# Patient Record
Sex: FEMALE | Race: WHITE | NOT HISPANIC OR LATINO | Employment: FULL TIME | URBAN - METROPOLITAN AREA
[De-identification: names, ages, dates, MRNs, and addresses within clinical notes are randomized per-mention and may not be internally consistent; named-entity substitution may affect disease eponyms.]

---

## 2023-10-16 DIAGNOSIS — Z30.41 ENCOUNTER FOR SURVEILLANCE OF CONTRACEPTIVE PILLS: Primary | ICD-10-CM

## 2023-10-16 RX ORDER — NORGESTIMATE AND ETHINYL ESTRADIOL 7DAYSX3 28
1 KIT ORAL DAILY
Qty: 28 TABLET | Refills: 11 | Status: SHIPPED | OUTPATIENT
Start: 2023-10-16 | End: 2023-12-20 | Stop reason: SDUPTHER

## 2023-10-17 ENCOUNTER — TELEPHONE (OUTPATIENT)
Dept: OBSTETRICS AND GYNECOLOGY | Facility: CLINIC | Age: 28
End: 2023-10-17
Payer: COMMERCIAL

## 2023-10-17 PROBLEM — K62.89 OTHER SPECIFIED DISEASES OF ANUS AND RECTUM: Status: ACTIVE | Noted: 2023-10-17

## 2023-10-17 PROBLEM — F41.1 ANXIETY, GENERALIZED: Status: ACTIVE | Noted: 2023-10-17

## 2023-10-17 RX ORDER — TRIAMCINOLONE ACETONIDE 1 MG/G
OINTMENT TOPICAL
COMMUNITY
Start: 2021-07-22 | End: 2024-01-31 | Stop reason: WASHOUT

## 2023-10-17 NOTE — TELEPHONE ENCOUNTER
----- Message from Ely Brown CMA sent at 10/16/2023  1:49 PM EDT -----  Regarding: FW: Refill Request    ----- Message -----  From: Chuyita Shaffer  Sent: 10/16/2023   9:07 AM EDT  To:  [id number] Clinical Support Staff  Subject: Refill Request                                   Patient calling for a refill of Tri Femynor.     Local pharmacy entered into Epic.     Note:  Annual Exam scheduled for 11-30-23.     Thank you

## 2023-10-18 ENCOUNTER — APPOINTMENT (OUTPATIENT)
Dept: OBSTETRICS AND GYNECOLOGY | Facility: CLINIC | Age: 28
End: 2023-10-18
Payer: COMMERCIAL

## 2023-11-30 ENCOUNTER — APPOINTMENT (OUTPATIENT)
Dept: OBSTETRICS AND GYNECOLOGY | Facility: CLINIC | Age: 28
End: 2023-11-30
Payer: COMMERCIAL

## 2023-12-20 ENCOUNTER — OFFICE VISIT (OUTPATIENT)
Dept: OBSTETRICS AND GYNECOLOGY | Facility: CLINIC | Age: 28
End: 2023-12-20
Payer: COMMERCIAL

## 2023-12-20 VITALS
SYSTOLIC BLOOD PRESSURE: 135 MMHG | HEIGHT: 63 IN | BODY MASS INDEX: 22.47 KG/M2 | DIASTOLIC BLOOD PRESSURE: 89 MMHG | WEIGHT: 126.8 LBS

## 2023-12-20 DIAGNOSIS — Z30.41 SURVEILLANCE FOR BIRTH CONTROL, ORAL CONTRACEPTIVES: ICD-10-CM

## 2023-12-20 DIAGNOSIS — Z30.41 ENCOUNTER FOR SURVEILLANCE OF CONTRACEPTIVE PILLS: ICD-10-CM

## 2023-12-20 DIAGNOSIS — Z01.419 WELL WOMAN EXAM WITH ROUTINE GYNECOLOGICAL EXAM: Primary | ICD-10-CM

## 2023-12-20 PROCEDURE — 99395 PREV VISIT EST AGE 18-39: CPT | Performed by: NURSE PRACTITIONER

## 2023-12-20 PROCEDURE — 1036F TOBACCO NON-USER: CPT | Performed by: NURSE PRACTITIONER

## 2023-12-20 RX ORDER — NORGESTIMATE AND ETHINYL ESTRADIOL 7DAYSX3 28
1 KIT ORAL DAILY
Qty: 84 TABLET | Refills: 3 | Status: SHIPPED | OUTPATIENT
Start: 2023-12-20 | End: 2024-01-11

## 2023-12-20 NOTE — PROGRESS NOTES
HPI: Eldon Feliciano is a 27 y.o. year old  presenting for annual gyn exam  Pt seen by me yearly, still on OCPs  Denies blood clots, stroke, HTN and migraines with aura.    Current concerns: none, pt doing well  Has long-term BF, working in Interior design, has been busy    Cycles are regular monthly, with 4 days flow   LMP:  Patient's last menstrual period was 2023.  Sexually active: yes  STI concerns: no  Contraception: OCPs   Last pap: 2022 normal  History of abnormal pap: no  Other gyn history: no   OB History    No obstetric history on file.      No past medical history on file.   No past surgical history on file.   Social History    Socioeconomic History      Marital status: Significant Other      Spouse name: Not on file      Number of children: Not on file      Years of education: Not on file      Highest education level: Not on file    Occupational History      Not on file    Tobacco Use      Smoking status: Never      Smokeless tobacco: Never    Substance and Sexual Activity      Alcohol use: Not on file      Drug use: Not on file      Sexual activity: Not on file    Other Topics      Concerns:        Not on file    Social History Narrative      Not on file    Social Determinants of Health  Financial Resource Strain: Not on file  Food Insecurity: Not on file  Transportation Needs: Not on file  Physical Activity: Not on file  Stress: Not on file  Social Connections: Not on file  Intimate Partner Violence: Not on file  Housing Stability: Not on file   Review of patient's family history indicates:  Problem: No Known Problems      Relation: Father          Name:               Age of Onset: (Not Specified)              Comment: Medical history unknown  Problem: Breast cancer      Relation: Maternal Grandmother          Name:               Age of Onset: (Not Specified)     Current Outpatient Medications:   norgestimate-ethinyl estradioL (Ortho Tri-Cyclen,Trinessa) 0.18/0.215/0.25 mg-35 mcg (28)  "tablet, Take 1 tablet by mouth once daily., Disp: 28 tablet, Rfl: 11  triamcinolone (Kenalog) 0.1 % ointment, APPLY SPARINGLY TO AFFECTED AREA(S) TWICE DAILY, Disp: , Rfl:           REVIEW OF SYSTEMS:  Breast. denies masses, nipple discharge  : denies dysuria, hematuria incontinence   Gl: denies change in bowel habits, blood in stools      PHYSICAL EXAM:  Vitals: /89 (BP Location: Right arm, Patient Position: Sitting)   Ht 1.588 m (5' 2.5\")   Wt 57.5 kg (126 lb 12.8 oz)   LMP 12/13/2023 Comment: OCP  BMI 22.82 kg/m²   Gen: well appearing woman in NAD  HEENT: normocephalic, thyroid not enlarged, no lymphadenopathy  CV: no m/r/g  Chest: CTAB, no w/r/r  Breast: normal tissue bilaterally without masses, skin changes, lymphadenopathy   Abdomen: soft, nontender, no masses/hernias, liver and spleen not enlarged   Pelvic:  - external genitalia: normal  - vagina: normal  - cervix: normal  - uterus: normal size, nontender  - adnexa: no palpable masses or tenderness  RECTAL: no external hemorrhoids; rectal exam deferred    ASSESSMENT/PLAN :    1) Health maintenance, Well-woman exam.  Pap/HPV up to date   Mammogram starting age 39yo  Nutrition, exercise and routine health maintenance exams reviewed.  Calcium/Vitamin D supplementation information provided   Smoking cessation: non-smoker  2) Contraception: OCPs satisfactory, new Rx sent.   Discussed DVT and cardiovascular risk with OCP use. Patient is aware of this risk and desires to continue current oral contraceptive.   3) STD screening: declines, no concerns  4) Follow up one year or sooner as needed   "

## 2024-01-09 DIAGNOSIS — Z30.41 ENCOUNTER FOR SURVEILLANCE OF CONTRACEPTIVE PILLS: ICD-10-CM

## 2024-01-11 RX ORDER — NORGESTIMATE AND ETHINYL ESTRADIOL 7DAYSX3 28
1 KIT ORAL DAILY
Qty: 84 TABLET | Refills: 3 | Status: SHIPPED | OUTPATIENT
Start: 2024-01-11

## 2024-01-31 ENCOUNTER — OFFICE VISIT (OUTPATIENT)
Dept: PRIMARY CARE | Facility: CLINIC | Age: 29
End: 2024-01-31
Payer: COMMERCIAL

## 2024-01-31 VITALS
WEIGHT: 129.2 LBS | TEMPERATURE: 97.9 F | SYSTOLIC BLOOD PRESSURE: 126 MMHG | HEART RATE: 93 BPM | HEIGHT: 62 IN | BODY MASS INDEX: 23.77 KG/M2 | OXYGEN SATURATION: 98 % | DIASTOLIC BLOOD PRESSURE: 82 MMHG

## 2024-01-31 DIAGNOSIS — F41.1 ANXIETY, GENERALIZED: ICD-10-CM

## 2024-01-31 DIAGNOSIS — Z01.84 IMMUNITY STATUS TESTING: ICD-10-CM

## 2024-01-31 DIAGNOSIS — K59.09 OTHER CONSTIPATION: ICD-10-CM

## 2024-01-31 DIAGNOSIS — F32.1 CURRENT MODERATE EPISODE OF MAJOR DEPRESSIVE DISORDER WITHOUT PRIOR EPISODE (MULTI): ICD-10-CM

## 2024-01-31 DIAGNOSIS — Z23 NEED FOR DIPHTHERIA-TETANUS-PERTUSSIS (TDAP) VACCINE: ICD-10-CM

## 2024-01-31 DIAGNOSIS — Z00.00 ENCOUNTER FOR PREVENTATIVE ADULT HEALTH CARE EXAMINATION: Primary | ICD-10-CM

## 2024-01-31 PROCEDURE — 90471 IMMUNIZATION ADMIN: CPT | Performed by: INTERNAL MEDICINE

## 2024-01-31 PROCEDURE — 90715 TDAP VACCINE 7 YRS/> IM: CPT | Performed by: INTERNAL MEDICINE

## 2024-01-31 PROCEDURE — 93000 ELECTROCARDIOGRAM COMPLETE: CPT | Performed by: INTERNAL MEDICINE

## 2024-01-31 PROCEDURE — 1036F TOBACCO NON-USER: CPT | Performed by: INTERNAL MEDICINE

## 2024-01-31 PROCEDURE — 99385 PREV VISIT NEW AGE 18-39: CPT | Performed by: INTERNAL MEDICINE

## 2024-01-31 RX ORDER — ESCITALOPRAM OXALATE 10 MG/1
10 TABLET ORAL DAILY
Qty: 90 TABLET | Refills: 0 | Status: SHIPPED | OUTPATIENT
Start: 2024-01-31 | End: 2024-02-27

## 2024-01-31 RX ORDER — AMOXICILLIN 500 MG/1
CAPSULE ORAL
COMMUNITY
Start: 2023-12-15 | End: 2024-01-31 | Stop reason: WASHOUT

## 2024-01-31 ASSESSMENT — ANXIETY QUESTIONNAIRES
7. FEELING AFRAID AS IF SOMETHING AWFUL MIGHT HAPPEN: MORE THAN HALF THE DAYS
1. FEELING NERVOUS, ANXIOUS, OR ON EDGE: MORE THAN HALF THE DAYS
GAD7 TOTAL SCORE: 16
3. WORRYING TOO MUCH ABOUT DIFFERENT THINGS: NEARLY EVERY DAY
6. BECOMING EASILY ANNOYED OR IRRITABLE: MORE THAN HALF THE DAYS
IF YOU CHECKED OFF ANY PROBLEMS ON THIS QUESTIONNAIRE, HOW DIFFICULT HAVE THESE PROBLEMS MADE IT FOR YOU TO DO YOUR WORK, TAKE CARE OF THINGS AT HOME, OR GET ALONG WITH OTHER PEOPLE: VERY DIFFICULT
2. NOT BEING ABLE TO STOP OR CONTROL WORRYING: NEARLY EVERY DAY
4. TROUBLE RELAXING: MORE THAN HALF THE DAYS
5. BEING SO RESTLESS THAT IT IS HARD TO SIT STILL: MORE THAN HALF THE DAYS

## 2024-01-31 ASSESSMENT — PATIENT HEALTH QUESTIONNAIRE - PHQ9
2. FEELING DOWN, DEPRESSED OR HOPELESS: SEVERAL DAYS
1. LITTLE INTEREST OR PLEASURE IN DOING THINGS: SEVERAL DAYS
8. MOVING OR SPEAKING SO SLOWLY THAT OTHER PEOPLE COULD HAVE NOTICED. OR THE OPPOSITE, BEING SO FIGETY OR RESTLESS THAT YOU HAVE BEEN MOVING AROUND A LOT MORE THAN USUAL: NOT AT ALL
SUM OF ALL RESPONSES TO PHQ9 QUESTIONS 1 & 2: 2
6. FEELING BAD ABOUT YOURSELF - OR THAT YOU ARE A FAILURE OR HAVE LET YOURSELF OR YOUR FAMILY DOWN: SEVERAL DAYS
9. THOUGHTS THAT YOU WOULD BE BETTER OFF DEAD, OR OF HURTING YOURSELF: NOT AT ALL
10. IF YOU CHECKED OFF ANY PROBLEMS, HOW DIFFICULT HAVE THESE PROBLEMS MADE IT FOR YOU TO DO YOUR WORK, TAKE CARE OF THINGS AT HOME, OR GET ALONG WITH OTHER PEOPLE: SOMEWHAT DIFFICULT
5. POOR APPETITE OR OVEREATING: SEVERAL DAYS
4. FEELING TIRED OR HAVING LITTLE ENERGY: MORE THAN HALF THE DAYS
SUM OF ALL RESPONSES TO PHQ QUESTIONS 1-9: 10
3. TROUBLE FALLING OR STAYING ASLEEP: NEARLY EVERY DAY
7. TROUBLE CONCENTRATING ON THINGS, SUCH AS READING THE NEWSPAPER OR WATCHING TELEVISION: SEVERAL DAYS

## 2024-01-31 ASSESSMENT — PAIN SCALES - GENERAL: PAINLEVEL: 0-NO PAIN

## 2024-01-31 NOTE — ASSESSMENT & PLAN NOTE
With concomitant anxiety, interested in medical management for now, not currently interested in behavioral management.  Initiate low-dose Lexapro.  Potential side effects and management expectations reviewed.  EKG today.  Will check in with me in 2 weeks and then follow-up again in 2 months

## 2024-01-31 NOTE — PROGRESS NOTES
"Subjective     Patient ID: Eldon Feliciano is a 28 y.o. female who presents for Establish Care, New Patient Visit, and Annual Exam.  HPI    28-year-old female new here for establishment of care for preventive care visit. Has not been seen in some time.     Past medical history  -Depression and anxiety-significant impacts her functioning.  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 More pasta than what she would want.   - Exercise - [employer] 4-5x/week when on a good schedule. Always been great.    - Sleep - poor, limited related to stress and anxiety       Review of Systems:   General: No constitutional symptoms, significant changes in weight  HEENT: No headaches, changes in vision or hearing, no sinus or dental issues   Cardiac: No chest pain, palpitations, dyspnea on exertion   Lungs: No cough, wheezing, shortness of breath   Abdomen: No abdominal pain, nausea/vomiting, diarrhea. +constipation   : No urinary complaints   Musculoskeletal: no joint pains, swelling or tenderness   Heme: No bleeding or thrombosis issues   Lymph: No swollen lymph glands   Skin: No rashes or lesions   Psych: as described      Objective       Current Outpatient Medications:     norgestimate-ethinyl estradioL (Ortho Tri-Cyclen,Trinessa) 0.18/0.215/0.25 mg-35 mcg (28) tablet, TAKE 1 TABLET BY MOUTH EVERY DAY, Disp: 84 tablet, Rfl: 3    escitalopram (Lexapro) 10 mg tablet, Take 1 tablet (10 mg) by mouth once daily., Disp: 90 tablet, Rfl: 0      /82   Pulse 93   Temp 36.6 °C (97.9 °F) (Temporal)   Ht 1.575 m (5' 2\")   Wt 58.6 kg (129 lb 3.2 oz)   SpO2 98%   BMI 23.63 kg/m²       Physical Examination:   General: Awake, alert, appears stated age   Head/eyes/ears: NCAT, EOMI, PERRL, TM WNL, no cerumen  Throat: " moist mucus membranes, no pharyngeal erythema  Neck: Supple, nontender, no lymphadenopathy, thyroid exam unremarkable   Breast exam: No palpable lumps, no discharge, no noted axillary lymphadenopathy. Chaperone offered and declined  Heart: RRR, no murmurs, rubs or gallops  Lungs: CTA bilaterally, no rhonchi rales or wheezes   Abdomen: Soft, NT/ND  Extremities: No edema, 2+ DP pulses   Skin: No concerning skin lesions on visualized skin   Neuro: AAO x 3, no FND, gait unremarkable    Assessment/Plan   Problem List Items Addressed This Visit      Adult Health Examination  Age appropriate screening performed   Healthy lifestyle reviewed.   No additional pertinent family history or toxic habits   No high risk sexual behavior, declines STI screen  Cancer screening:   -Discussed self breast exams  - Pap smear- 9/22 follows with Rossana Ingram   - Skin cancer prevention strategies reviewed   Immunizations: Flu shot declined, COVID booster due, Tdap due,   Dental and visual examinations UTD   Discussed adequate Vitamin D intake      Anxiety, generalized    Relevant Medications    escitalopram (Lexapro) 10 mg tablet    Other Relevant Orders    Follow Up In [facility] - PCP - Established    ECG 12 lead (Clinic Performed)    Current moderate episode of major depressive disorder without prior episode (CMS/HCC)     With concomitant anxiety, interested in medical management for now, not currently interested in behavioral management.  Initiate low-dose Lexapro.  Potential side effects and management expectations reviewed.  EKG today.  Will check in with me in 2 weeks and then follow-up again in 2 months         Relevant Medications    escitalopram (Lexapro) 10 mg tablet    Other Relevant Orders    Follow Up In [facility] - PCP - Established    ECG 12 lead (Clinic Performed)    Other constipation     Advised fiber intake and MiraLAX no concerns for IBS.          Other Visit Diagnoses       Encounter for  preventative adult health care examination    -  Primary    Relevant Orders    Comprehensive Metabolic Panel    CBC and Auto Differential    Hemoglobin A1C    Lipid Panel    TSH with reflex to Free T4 if abnormal    Vitamin D 25-Hydroxy,Total (for eval of Vitamin D levels)    Immunity status testing        Relevant Orders    Hepatitis B Surface Antigen    Hepatitis B Surface Antibody    Need for diphtheria-tetanus-pertussis (Tdap) vaccine        Relevant Orders    Tdap vaccine, age 7 years and older (Completed)     Followup 2 months

## 2024-01-31 NOTE — PATIENT INSTRUCTIONS
Eldon,   I have ordered blood and/or urine tests for you to do today. The lab can be found on this floor (2nd floor) next to the pharmacy across from the elevators.    For depression and anxiety   Start Lexapro 5mg once per day (half a tablet). If no response after 1 week increase the dose to full tablet once per day for 2 weeks  Let me know how you feel via messaging after 2 weeks  Constipation - increase fiber and add miralax   Followup 2-3 months

## 2024-02-23 DIAGNOSIS — F41.1 ANXIETY, GENERALIZED: ICD-10-CM

## 2024-02-23 DIAGNOSIS — F32.1 CURRENT MODERATE EPISODE OF MAJOR DEPRESSIVE DISORDER WITHOUT PRIOR EPISODE (MULTI): ICD-10-CM

## 2024-02-27 RX ORDER — ESCITALOPRAM OXALATE 10 MG/1
10 TABLET ORAL DAILY
Qty: 30 TABLET | Refills: 0 | Status: SHIPPED | OUTPATIENT
Start: 2024-02-27 | End: 2024-04-16

## 2024-03-01 ENCOUNTER — LAB (OUTPATIENT)
Dept: LAB | Facility: LAB | Age: 29
End: 2024-03-01
Payer: COMMERCIAL

## 2024-03-01 DIAGNOSIS — Z00.00 ENCOUNTER FOR PREVENTATIVE ADULT HEALTH CARE EXAMINATION: ICD-10-CM

## 2024-03-01 DIAGNOSIS — Z01.84 IMMUNITY STATUS TESTING: ICD-10-CM

## 2024-03-01 LAB
25(OH)D3 SERPL-MCNC: 28 NG/ML (ref 30–100)
ALBUMIN SERPL BCP-MCNC: 4.6 G/DL (ref 3.4–5)
ALP SERPL-CCNC: 44 U/L (ref 33–110)
ALT SERPL W P-5'-P-CCNC: 9 U/L (ref 7–45)
ANION GAP SERPL CALC-SCNC: 16 MMOL/L (ref 10–20)
AST SERPL W P-5'-P-CCNC: 15 U/L (ref 9–39)
BASOPHILS # BLD AUTO: 0.05 X10*3/UL (ref 0–0.1)
BASOPHILS NFR BLD AUTO: 0.7 %
BILIRUB SERPL-MCNC: 0.7 MG/DL (ref 0–1.2)
BUN SERPL-MCNC: 16 MG/DL (ref 6–23)
CALCIUM SERPL-MCNC: 9.6 MG/DL (ref 8.6–10.6)
CHLORIDE SERPL-SCNC: 103 MMOL/L (ref 98–107)
CHOLEST SERPL-MCNC: 200 MG/DL (ref 0–199)
CHOLESTEROL/HDL RATIO: 2.6
CO2 SERPL-SCNC: 23 MMOL/L (ref 21–32)
CREAT SERPL-MCNC: 0.8 MG/DL (ref 0.5–1.05)
EGFRCR SERPLBLD CKD-EPI 2021: >90 ML/MIN/1.73M*2
EOSINOPHIL # BLD AUTO: 0.1 X10*3/UL (ref 0–0.7)
EOSINOPHIL NFR BLD AUTO: 1.3 %
ERYTHROCYTE [DISTWIDTH] IN BLOOD BY AUTOMATED COUNT: 13 % (ref 11.5–14.5)
EST. AVERAGE GLUCOSE BLD GHB EST-MCNC: 94 MG/DL
GLUCOSE SERPL-MCNC: 82 MG/DL (ref 74–99)
HBA1C MFR BLD: 4.9 %
HBV SURFACE AB SER-ACNC: <3.1 MIU/ML
HBV SURFACE AG SERPL QL IA: NONREACTIVE
HCT VFR BLD AUTO: 42.1 % (ref 36–46)
HDLC SERPL-MCNC: 77.6 MG/DL
HGB BLD-MCNC: 13 G/DL (ref 12–16)
IMM GRANULOCYTES # BLD AUTO: 0.03 X10*3/UL (ref 0–0.7)
IMM GRANULOCYTES NFR BLD AUTO: 0.4 % (ref 0–0.9)
LDLC SERPL CALC-MCNC: 87 MG/DL
LYMPHOCYTES # BLD AUTO: 2.74 X10*3/UL (ref 1.2–4.8)
LYMPHOCYTES NFR BLD AUTO: 35.6 %
MCH RBC QN AUTO: 25.7 PG (ref 26–34)
MCHC RBC AUTO-ENTMCNC: 30.9 G/DL (ref 32–36)
MCV RBC AUTO: 83 FL (ref 80–100)
MONOCYTES # BLD AUTO: 0.36 X10*3/UL (ref 0.1–1)
MONOCYTES NFR BLD AUTO: 4.7 %
NEUTROPHILS # BLD AUTO: 4.41 X10*3/UL (ref 1.2–7.7)
NEUTROPHILS NFR BLD AUTO: 57.3 %
NON HDL CHOLESTEROL: 122 MG/DL (ref 0–149)
NRBC BLD-RTO: 0 /100 WBCS (ref 0–0)
PLATELET # BLD AUTO: 240 X10*3/UL (ref 150–450)
POTASSIUM SERPL-SCNC: 4.2 MMOL/L (ref 3.5–5.3)
PROT SERPL-MCNC: 7.3 G/DL (ref 6.4–8.2)
RBC # BLD AUTO: 5.05 X10*6/UL (ref 4–5.2)
SODIUM SERPL-SCNC: 138 MMOL/L (ref 136–145)
TRIGL SERPL-MCNC: 177 MG/DL (ref 0–149)
TSH SERPL-ACNC: 3.94 MIU/L (ref 0.44–3.98)
VLDL: 35 MG/DL (ref 0–40)
WBC # BLD AUTO: 7.7 X10*3/UL (ref 4.4–11.3)

## 2024-03-01 PROCEDURE — 80053 COMPREHEN METABOLIC PANEL: CPT

## 2024-03-01 PROCEDURE — 84443 ASSAY THYROID STIM HORMONE: CPT

## 2024-03-01 PROCEDURE — 80061 LIPID PANEL: CPT

## 2024-03-01 PROCEDURE — 83036 HEMOGLOBIN GLYCOSYLATED A1C: CPT

## 2024-03-01 PROCEDURE — 36415 COLL VENOUS BLD VENIPUNCTURE: CPT

## 2024-03-01 PROCEDURE — 86706 HEP B SURFACE ANTIBODY: CPT

## 2024-03-01 PROCEDURE — 87340 HEPATITIS B SURFACE AG IA: CPT

## 2024-03-01 PROCEDURE — 85025 COMPLETE CBC W/AUTO DIFF WBC: CPT

## 2024-03-01 PROCEDURE — 82306 VITAMIN D 25 HYDROXY: CPT

## 2024-04-16 DIAGNOSIS — F32.1 CURRENT MODERATE EPISODE OF MAJOR DEPRESSIVE DISORDER WITHOUT PRIOR EPISODE (MULTI): ICD-10-CM

## 2024-04-16 DIAGNOSIS — F41.1 ANXIETY, GENERALIZED: ICD-10-CM

## 2024-04-16 RX ORDER — ESCITALOPRAM OXALATE 10 MG/1
10 TABLET ORAL DAILY
Qty: 90 TABLET | Refills: 0 | Status: SHIPPED | OUTPATIENT
Start: 2024-04-16 | End: 2024-05-06 | Stop reason: DRUGHIGH

## 2024-05-06 DIAGNOSIS — F32.1 CURRENT MODERATE EPISODE OF MAJOR DEPRESSIVE DISORDER WITHOUT PRIOR EPISODE (MULTI): ICD-10-CM

## 2024-05-06 DIAGNOSIS — F41.1 ANXIETY, GENERALIZED: Primary | ICD-10-CM

## 2024-05-06 RX ORDER — ESCITALOPRAM OXALATE 20 MG/1
20 TABLET ORAL DAILY
Qty: 30 TABLET | Refills: 1 | Status: SHIPPED | OUTPATIENT
Start: 2024-05-06 | End: 2024-07-05

## 2024-06-12 ENCOUNTER — APPOINTMENT (OUTPATIENT)
Dept: PRIMARY CARE | Facility: CLINIC | Age: 29
End: 2024-06-12
Payer: COMMERCIAL

## 2024-06-19 ENCOUNTER — APPOINTMENT (OUTPATIENT)
Dept: PRIMARY CARE | Facility: CLINIC | Age: 29
End: 2024-06-19
Payer: COMMERCIAL

## 2024-06-19 VITALS
BODY MASS INDEX: 25.42 KG/M2 | DIASTOLIC BLOOD PRESSURE: 80 MMHG | SYSTOLIC BLOOD PRESSURE: 126 MMHG | HEART RATE: 78 BPM | OXYGEN SATURATION: 97 % | TEMPERATURE: 97.3 F | WEIGHT: 139 LBS

## 2024-06-19 DIAGNOSIS — F41.1 ANXIETY, GENERALIZED: Primary | ICD-10-CM

## 2024-06-19 DIAGNOSIS — F32.1 CURRENT MODERATE EPISODE OF MAJOR DEPRESSIVE DISORDER WITHOUT PRIOR EPISODE (MULTI): ICD-10-CM

## 2024-06-19 PROCEDURE — 1036F TOBACCO NON-USER: CPT | Performed by: INTERNAL MEDICINE

## 2024-06-19 PROCEDURE — 99213 OFFICE O/P EST LOW 20 MIN: CPT | Performed by: INTERNAL MEDICINE

## 2024-06-19 RX ORDER — ESCITALOPRAM OXALATE 20 MG/1
20 TABLET ORAL DAILY
Qty: 90 TABLET | Refills: 1 | Status: SHIPPED | OUTPATIENT
Start: 2024-06-19 | End: 2024-12-16

## 2024-06-19 ASSESSMENT — ANXIETY QUESTIONNAIRES
1. FEELING NERVOUS, ANXIOUS, OR ON EDGE: SEVERAL DAYS
3. WORRYING TOO MUCH ABOUT DIFFERENT THINGS: MORE THAN HALF THE DAYS
7. FEELING AFRAID AS IF SOMETHING AWFUL MIGHT HAPPEN: SEVERAL DAYS
GAD7 TOTAL SCORE: 9
6. BECOMING EASILY ANNOYED OR IRRITABLE: SEVERAL DAYS
IF YOU CHECKED OFF ANY PROBLEMS ON THIS QUESTIONNAIRE, HOW DIFFICULT HAVE THESE PROBLEMS MADE IT FOR YOU TO DO YOUR WORK, TAKE CARE OF THINGS AT HOME, OR GET ALONG WITH OTHER PEOPLE: SOMEWHAT DIFFICULT
2. NOT BEING ABLE TO STOP OR CONTROL WORRYING: SEVERAL DAYS
5. BEING SO RESTLESS THAT IT IS HARD TO SIT STILL: SEVERAL DAYS
4. TROUBLE RELAXING: MORE THAN HALF THE DAYS

## 2024-06-19 ASSESSMENT — PATIENT HEALTH QUESTIONNAIRE - PHQ9
2. FEELING DOWN, DEPRESSED OR HOPELESS: SEVERAL DAYS
SUM OF ALL RESPONSES TO PHQ QUESTIONS 1-9: 9
1. LITTLE INTEREST OR PLEASURE IN DOING THINGS: NOT AT ALL
4. FEELING TIRED OR HAVING LITTLE ENERGY: MORE THAN HALF THE DAYS
9. THOUGHTS THAT YOU WOULD BE BETTER OFF DEAD, OR OF HURTING YOURSELF: NOT AT ALL
5. POOR APPETITE OR OVEREATING: MORE THAN HALF THE DAYS
7. TROUBLE CONCENTRATING ON THINGS, SUCH AS READING THE NEWSPAPER OR WATCHING TELEVISION: SEVERAL DAYS
6. FEELING BAD ABOUT YOURSELF - OR THAT YOU ARE A FAILURE OR HAVE LET YOURSELF OR YOUR FAMILY DOWN: MORE THAN HALF THE DAYS
10. IF YOU CHECKED OFF ANY PROBLEMS, HOW DIFFICULT HAVE THESE PROBLEMS MADE IT FOR YOU TO DO YOUR WORK, TAKE CARE OF THINGS AT HOME, OR GET ALONG WITH OTHER PEOPLE: SOMEWHAT DIFFICULT
3. TROUBLE FALLING OR STAYING ASLEEP: SEVERAL DAYS
8. MOVING OR SPEAKING SO SLOWLY THAT OTHER PEOPLE COULD HAVE NOTICED. OR THE OPPOSITE, BEING SO FIGETY OR RESTLESS THAT YOU HAVE BEEN MOVING AROUND A LOT MORE THAN USUAL: NOT AT ALL
SUM OF ALL RESPONSES TO PHQ9 QUESTIONS 1 & 2: 1

## 2024-06-19 ASSESSMENT — PAIN SCALES - GENERAL: PAINLEVEL: 0-NO PAIN

## 2024-06-19 NOTE — PATIENT INSTRUCTIONS
Eldon,   Continue lexparo at 20mg   Referral to our behavioral health person named Nano - she should be reaching out to you   Consider Hepatitis B vaccination series   Followup again in 3 months - can be virtual

## 2024-06-19 NOTE — ASSESSMENT & PLAN NOTE
With overall improvement in PHQ-9 and EMY-7 scores with administration of Lexapro 20 mg.  Fatigue has since improved.  Not interested in escalation of dose at present.  Would be amenable to referral to behavioral health.  Will refer.

## 2024-06-19 NOTE — PROGRESS NOTES
Subjective   Patient ID: Eldon Feliciano is a 28 y.o. female who presents for Follow-up.  HPI  28-year-old female here for follow-up visit, last seen in January for establishment of care and preventive care visit    2/24: some fatigue on lexapro some anxiety go up on the dose? Keep same dose.   3/24: labs: trigs high, vitamin D 28, hep b not immune otherwise labs good.  5/24: Increase Lexapro to 20 mg    Past medical history  -Depression and anxiety -significant impacts her functioning.  With concomitant life stressors uses alcohol to cope, started Lexapro uptitrated to 20mg last month. Has noted improvement in symptoms, notes to be more calm, does note some fatigue in the morning where she has to take a 15 minute nap. On initial administration felt significant and severe fatigue but eventually subsided.   - Constipation -MiraLAX  - Hypertriglyceridemia - working on lifestyle modifications, notes to be more calm about scheduling things.      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  Neuro: AAO x 3, answering questions appropriately, no obvious cranial nerve deficits     Assessment/Plan   Problem List Items Addressed This Visit       Anxiety, generalized - Primary     With overall improvement in PHQ-9 and EMY-7 scores with administration of Lexapro 20 mg.  Fatigue has since improved.  Not interested in  escalation of dose at present.  Would be amenable to referral to behavioral health.  Will refer.         Relevant Medications    escitalopram (Lexapro) 20 mg tablet    Other Relevant Orders    Follow Up In [facility] - [facility]    Follow Up In [facility] - PCP - Established    Current moderate episode of major depressive disorder without prior episode (Multi)    Relevant Medications    escitalopram (Lexapro) 20 mg tablet    Other Relevant Orders    Follow Up In [facility]    Follow Up In [facility] - PCP - Established     PHQ9 9 (previously 10), GAD7 7 (previously 16).     Increased triglyceride counts  Has been working on lifestyle modifications    Health Maintenance  Cancer screening:   - Pap 9/22  Immunizations: Hep B not immune will defer vaccination for now.

## 2024-10-10 ENCOUNTER — TELEMEDICINE (OUTPATIENT)
Dept: PRIMARY CARE | Facility: CLINIC | Age: 29
End: 2024-10-10
Payer: COMMERCIAL

## 2024-10-10 DIAGNOSIS — F32.1 CURRENT MODERATE EPISODE OF MAJOR DEPRESSIVE DISORDER WITHOUT PRIOR EPISODE (MULTI): ICD-10-CM

## 2024-10-10 DIAGNOSIS — Z82.0 FAMILY HISTORY OF ALZHEIMER DISEASE: ICD-10-CM

## 2024-10-10 DIAGNOSIS — F41.1 ANXIETY, GENERALIZED: Primary | ICD-10-CM

## 2024-10-10 DIAGNOSIS — Z78.9 ALCOHOL USE: ICD-10-CM

## 2024-10-10 PROBLEM — F10.90 ALCOHOL USE: Status: ACTIVE | Noted: 2024-10-10

## 2024-10-10 PROCEDURE — 99214 OFFICE O/P EST MOD 30 MIN: CPT | Performed by: INTERNAL MEDICINE

## 2024-10-10 PROCEDURE — 1036F TOBACCO NON-USER: CPT | Performed by: INTERNAL MEDICINE

## 2024-10-10 RX ORDER — BUPROPION HYDROCHLORIDE 150 MG/1
150 TABLET ORAL EVERY MORNING
Qty: 30 TABLET | Refills: 0 | Status: SHIPPED | OUTPATIENT
Start: 2024-10-10 | End: 2024-11-09

## 2024-10-10 ASSESSMENT — PATIENT HEALTH QUESTIONNAIRE - PHQ9
2. FEELING DOWN, DEPRESSED OR HOPELESS: NEARLY EVERY DAY
5. POOR APPETITE OR OVEREATING: MORE THAN HALF THE DAYS
8. MOVING OR SPEAKING SO SLOWLY THAT OTHER PEOPLE COULD HAVE NOTICED. OR THE OPPOSITE, BEING SO FIGETY OR RESTLESS THAT YOU HAVE BEEN MOVING AROUND A LOT MORE THAN USUAL: NOT AT ALL
3. TROUBLE FALLING OR STAYING ASLEEP: NEARLY EVERY DAY
SUM OF ALL RESPONSES TO PHQ QUESTIONS 1-9: 19
7. TROUBLE CONCENTRATING ON THINGS, SUCH AS READING THE NEWSPAPER OR WATCHING TELEVISION: NEARLY EVERY DAY
10. IF YOU CHECKED OFF ANY PROBLEMS, HOW DIFFICULT HAVE THESE PROBLEMS MADE IT FOR YOU TO DO YOUR WORK, TAKE CARE OF THINGS AT HOME, OR GET ALONG WITH OTHER PEOPLE: EXTREMELY DIFFICULT
4. FEELING TIRED OR HAVING LITTLE ENERGY: NEARLY EVERY DAY
6. FEELING BAD ABOUT YOURSELF - OR THAT YOU ARE A FAILURE OR HAVE LET YOURSELF OR YOUR FAMILY DOWN: NEARLY EVERY DAY
9. THOUGHTS THAT YOU WOULD BE BETTER OFF DEAD, OR OF HURTING YOURSELF: NOT AT ALL
1. LITTLE INTEREST OR PLEASURE IN DOING THINGS: MORE THAN HALF THE DAYS
SUM OF ALL RESPONSES TO PHQ9 QUESTIONS 1 & 2: 5

## 2024-10-10 NOTE — ASSESSMENT & PLAN NOTE
With history of greater than 5 drinks a day in the past, associated with understanding consequences satisfies DSM-5 criteria for AUD, interested in medical management.  Uses alcohol to cope with symptoms of depression and anxiety.   discussed consideration for use of naltrexone and she is amenable.  Side effects and management expectations reviewed including nausea, headache, dizziness and fatigue.  Will start low-dose and uptitrate as tolerated after initiation of Wellbutrin.      Orders:    Follow Up In [facility] - [facility]; Future

## 2024-10-10 NOTE — ASSESSMENT & PLAN NOTE
With concomitant stressors related to this worsening symptoms of mainly depression at this point.  Refer to Alzheimer's [employer] website for support, has been locating resources through her temple as well.

## 2024-10-10 NOTE — ASSESSMENT & PLAN NOTE
Impression:  Colonoscopy up to the cecum  1 cm polyp removed from descending colon  Mild sigmoid colon diverticulosis  Small internal hemorrhoids    Recommendations  Awaiting pathology results due to biopsy(s) performed.  Avoid NSAIDs for 2-weeks  Please report to the ER if you have significant abdominal pain, fever, inability to pass gas or pass blood in stool.  Repeat colonoscopy in 3 years    AFTER YOUR ENDOSCOPY/COLONOSCOPY    Date of Procedure:  9/6/2024    You had the following procedure(s) performed today:  Colonoscopy    Exam Results:  The physician removed 1 polyp(s), which will be sent to the lab for testing. Results can take up to 14 business days to be communicated to you.  Your physician will contact you with the result of your biopsy and when your follow-up colonoscopy is due and Lab results will be called or mailed to you within 7-10 business days.    Diet Instructions:  You may resume your regular diet today. It is recommended to avoid heavy, greasy, and spicy foods today. and Your doctor also advises that you increase your fiber intake (fruits, vegetables, whole grains, etc.) If necessary, you may also add a fiber supplement, such as Metamucil or Citrucel.    Medications:  You may resume your medications as prescribed. and Do not use aspirin or ibuprofen (Advil, Motrin, etc.) or other NSAIDS (anti-inflammatory drugs like Aleve or Naprosyn) for 14 days.  You may use Tylenol (acetaminophen) for relief is necessary.    Activity for Today:    DO NOT DRIVE.  Do not operate any other heavy machinery or equipment.  Avoid activities that require coordination (climbing ladders, using a knife/cooking equipment, etc.)  Do not make important financial or legal decisions  Do not return to work.  Do not drink any alcohol.  Rest the remainder of the day.     ** You may resume your activity tomorrow.    It is NORMAL to have.....    Colonoscopy / Sigmoidoscopy   Mild abdominal distention and/or cramping are normal  Symptoms overall improved on Lexapro 20 mg, though now worsening depression in the setting of life stressors.        after these procedures, but should pass within an hour or two with the passage of air.  A small amount of rectal bleeding (a few streaks of blood on the toilet paper) is normal following biopsy, polypectomy or if you have hemorrhoids.   Mild nausea and/or vomiting       Please CALL Dr. Nails - [phone]    For Colonoscopy / Sigmoidoscopy   If you have unusual belly pain that is NOT relieved with passing air  If you have rectal bleeding that is more than just a few streaks of blood on the toilet tissue  If you have moderate nausea and/or vomiting         Go to the EMERGENCY ROOM if you experience any of the following:  Severe pain  Difficulty breathing or swallowing  Persistent vomiting  Vomiting blood, either brown (coffee ground in consistency) or red  Significant rectal bleeding of bright red blood  Black colored or bloody stool  Chills or fever over 101 degrees F.     Additional Instructions:  Any further questions after hours please contact [facility] 24 hours nurse call center at [phone].        Understanding Colon and Rectal Polyps  The colon (also called the large intestine) is a muscular tube that forms the last part of the digestive tract. It absorbs water and stores food waste. The colon is about 4 to 6 feet long. The rectum is the last 6 inches of the colon. The colon and rectum have a smooth lining composed of millions of cells. Changes in these cells can lead to growths called polyps in the colon. These can become cancerous and should be removed. Many tests are available to screen for colon cancer. But colonoscopy is the only test that looks directly into the entire large intestine and allows for treatment right away. During colonoscopy, these polyps can be removed. How often you need this test depends on many things. These include your condition, your family history, symptoms, and what the findings were at the previous colonoscopy.    When the colon lining changes  Changes that happen  in the cells that line the colon or rectum can lead to growths called polyps. Over a period of years, polyps can turn into cancer. Removing polyps early may prevent cancer from ever forming.   Polyps  Polyps are fleshy clumps of tissue that form on the lining of the colon or rectum. Small polyps are usually not cancer (benign). But over time, cells in a polyp can change and become precancerous. Certain types of polyps known as adenomatous polyps and serrated polyps are precancerous. The risk for cancer also increases with the size of the polyp and certain cell and gene features. This means that they may become cancerous if they're not removed. Hyperplastic polyps are benign. They can grow quite large and not turn cancerous.      Cancer  Almost all colorectal cancers start when polyp cells start growing abnormally. As a cancerous tumor grows, it may affect more and more of the colon or rectum. In time, cancer can also grow beyond the colon or rectum and spread to nearby organs or to glands called lymph nodes. The cells can also travel to other parts of the body. This is known as metastasis. The earlier a cancerous tumor is removed, the better the chance of preventing its spread.     Rober[employer] last reviewed this educational content on 10/1/2021    © 7933-4427 [employer]. All rights reserved. This information is not intended as a substitute for professional medical care. Always follow your healthcare professional's instructions.          Diverticulosis    Diverticulosis means that small pouches have formed in the wall of your large intestine (colon). Most often, this problem causes no symptoms and is common as people age. But the pouches in the colon are at risk of becoming infected. When this happens, the condition is called diverticulitis. Although most people with diverticulosis never develop diverticulitis, it is still not uncommon. Rectal bleeding can also occur and in less common situations, a type  of colon inflammation called colitis.  While most people don't have symptoms, some people with diverticulosis may have:  Abdominal cramps and pain  Bloating  Constipation  Change in bowel habits  Causes  The exact cause of diverticulosis (and diverticulitis) has not been proved, but a few things are associated with the condition:  Low-fiber diet  Constipation  Lack of exercise  Your healthcare provider will talk with you about how to manage your condition. Diet changes may be all that are needed to help control diverticulosis and prevent progression to diverticulitis. If you develop diverticulitis, you will likely need other treatments.  Home care  You may be told to take fiber supplements daily. Fiber adds bulk to the stool so that it passes through the colon more easily. Stool softeners may also be recommended. You may also be given medicines for pain relief. Be sure to take all medicines as directed.  In the past, people were told to avoid corn, nuts, and seeds. This is no longer necessary.  Follow these guidelines when caring for yourself at home:  Eat unprocessed foods that are high in fiber. Whole-grains, fruits, and vegetables are good choices.  Drink 6 to 8 glasses of water every day unless your healthcare provider has you limit how much fluid you should have.  Watch for changes in your bowel movements. Tell your provider if you notice any changes.  Begin an exercise program. Ask your provider how to get started. Generally, walking is the best.  Get plenty of rest and sleep.  Follow-up care  Follow up with your healthcare provider, or as advised. Regular visits may be needed to check on your health. Sometimes special procedures such as colonoscopy, are needed after an episode of diverticulitis or blooding. Be sure to keep all your appointments.  If a stool sample was taken, or cultures were done, you should be told if they are positive, or if your treatment needs to be changed. You can call as directed for  the results.  If X-rays were done, a radiologist will look at them. You will be told if there is a change in your treatment.  If antibiotics were prescribed, be sure to finish them all.  When to seek medical advice  Call your healthcare provider right away if any of these occur:  Fever of 100.4°F (38°C) or higher, or as directed by your healthcare provider  Severe cramps in the lower left side of the abdomen or pain that is getting worse  Tenderness in the lower left side of the abdomen or worsening pain throughout the abdomen  Diarrhea or constipation that doesn't get better within 24 hours  Nausea and vomiting  Bleeding from the rectum  Call 911  Call 911 if any of the following occur:  Trouble breathing  Confusion  Very drowsy or trouble awakening  Fainting or loss of consciousness  Rapid heart rate  Chest pain  [employer] last reviewed this educational content on 6/1/2018 © 2000-2021 [employer]. All rights reserved. This information is not intended as a substitute for professional medical care. Always follow your healthcare professional's instructions.           Want to Say “Thank You” to a Nurse?  The JEYSON Award® was created in memory of GLENNY Singletary by his family to say thank you to bedside nurses who provide an outstanding level of care.  Submit a nomination using any method below.     OR    [email or web address]

## 2024-10-10 NOTE — PROGRESS NOTES
Subjective   Patient ID: Eldon Feliciano is a 28 y.o. female who presents for No chief complaint on file..  HPI  28F here for followup visit, last seen in June.     8/24 declined BHI  10/24 worsening anxiety and depression lack of motivation to schedule visit or increased dose of Lexapro asked about SI/HI    -Depression and anxiety -was improved on Lexapro 20 - with concomitant life stressors uses alcohol to cope.   Finds lack of motivation, fatigue, not sleeping well, wakes up in the middle of the night and needs to take a nap in the middle of the day. The lexapro has been helping with anxiety but not with depression. Denies SI/HI.   She continues to turn to alcohol to cope. She remains hesitant to engage with behavioral health.  - Alcohol  use - ranges between 1-2 up to 5 drinks a day through certain triggers.     Past medical history  - Constipation -MiraLAX  - Hypertriglyceridemia - working on lifestyle modifications, notes to be more calm about scheduling things.      Social  -Works in [employer], has a longtime boyfriend   - No tobacco, 2-3 drinks/day, no drugs   Current Outpatient Medications   Medication Instructions   • escitalopram (LEXAPRO) 20 mg, oral, Daily   • norgestimate-ethinyl estradioL (Ortho Tri-Cyclen,Trinessa) 0.18/0.215/0.25 mg-35 mcg (28) tablet 1 tablet, oral, Daily        Objective     There were no vitals taken for this visit.    Physical Exam  General: Alert and oriented, in no apparent distress   HEENT: No conjunctival erythema, no external facial lesions   Lungs: Breathing comfortably  Skin: No evidence of skin breakdown.  Neuro: AAO x 3, answering questions appropriately, no obvious cranial nerve deficits     Assessment/Plan     Assessment & Plan  Anxiety, generalized  Symptoms overall improved on Lexapro 20 mg, though now worsening depression in the setting of life stressors.       Current moderate episode of major depressive disorder without prior episode (Multi)  PHQ-9  performed, persist despite Lexapro 20 mg.  Main concern is anhedonia and fatigue.   Add Wellbutrin to Lexapro 150 mg  Potential side effects management reviewed including insomnia, agitation, dry mouth, and headache.  There is no underlying seizure or eating disorder.    Also now amenable to referral to behavioral health related out for assistance  Will notify me if symptoms fail to improve within 2 to 2 weeks, if some improvement may consider up titration of dose.    Orders:  •  buPROPion XL (Wellbutrin XL) 150 mg 24 hr tablet; Take 1 tablet (150 mg) by mouth once daily in the morning. Do not crush, chew, or split.  •  Follow Up In [facility] - [facility]; Future  •  Follow Up In [facility] - PCP - Established; Future    Family history of Alzheimer disease  With concomitant stressors related to this worsening symptoms of mainly depression at this point.  Refer to Alzheimer's Association website for support, has been locating resources through her temple as well.         Alcohol use  With history of greater than 5 drinks a day in the past, associated with understanding consequences satisfies DSM-5 criteria for AUD, interested in medical management.  Uses alcohol to cope with symptoms of depression and anxiety.   discussed consideration for use of naltrexone and she is amenable.  Side effects and management expectations reviewed including nausea, headache, dizziness and fatigue.  Will start low-dose and uptitrate as tolerated after initiation of Wellbutrin.      Orders:  •  Follow Up In [facility]; Future      Health Maintenance  Cancer screening:   - Pap 9/22  Immunizations:     I performed this visit using realtime telehealth tools, including an audio/video OR telephone connection between  Eldon Feliciano and myself, Dr. Rajni Royal.   The patient expressed consent to use this telemedicine service,  understands the limitations of the visit, that they will not be physically examined and all issues may not be able to be addressed.    Follow-up in 1 month

## 2024-10-10 NOTE — ASSESSMENT & PLAN NOTE
PHQ-9 performed, persist despite Lexapro 20 mg.  Main concern is anhedonia and fatigue.   Add Wellbutrin to Lexapro 150 mg  Potential side effects management reviewed including insomnia, agitation, dry mouth, and headache.  There is no underlying seizure or eating disorder.    Also now amenable to referral to behavioral health related out for assistance  Will notify me if symptoms fail to improve within 2 to 2 weeks, if some improvement may consider up titration of dose.    Orders:    buPROPion XL (Wellbutrin XL) 150 mg 24 hr tablet; Take 1 tablet (150 mg) by mouth once daily in the morning. Do not crush, chew, or split.    Follow Up In [facility]; Future    Follow Up In [facility] - PCP - Established; Future

## 2024-11-06 DIAGNOSIS — F32.1 CURRENT MODERATE EPISODE OF MAJOR DEPRESSIVE DISORDER WITHOUT PRIOR EPISODE (MULTI): ICD-10-CM

## 2024-11-06 RX ORDER — BUPROPION HYDROCHLORIDE 150 MG/1
150 TABLET ORAL EVERY MORNING
Qty: 30 TABLET | Refills: 0 | Status: SHIPPED | OUTPATIENT
Start: 2024-11-06 | End: 2024-12-06

## 2024-12-04 DIAGNOSIS — F32.1 CURRENT MODERATE EPISODE OF MAJOR DEPRESSIVE DISORDER WITHOUT PRIOR EPISODE (MULTI): ICD-10-CM

## 2024-12-05 RX ORDER — BUPROPION HYDROCHLORIDE 150 MG/1
150 TABLET ORAL EVERY MORNING
Qty: 90 TABLET | Refills: 0 | Status: SHIPPED | OUTPATIENT
Start: 2024-12-05 | End: 2025-03-05

## 2024-12-29 DIAGNOSIS — F41.1 ANXIETY, GENERALIZED: ICD-10-CM

## 2024-12-29 DIAGNOSIS — F32.1 CURRENT MODERATE EPISODE OF MAJOR DEPRESSIVE DISORDER WITHOUT PRIOR EPISODE (MULTI): ICD-10-CM

## 2024-12-30 RX ORDER — ESCITALOPRAM OXALATE 20 MG/1
20 TABLET ORAL DAILY
Qty: 90 TABLET | Refills: 1 | Status: SHIPPED | OUTPATIENT
Start: 2024-12-30

## 2025-02-23 DIAGNOSIS — Z30.41 ENCOUNTER FOR SURVEILLANCE OF CONTRACEPTIVE PILLS: ICD-10-CM

## 2025-02-24 RX ORDER — NORGESTIMATE AND ETHINYL ESTRADIOL 7DAYSX3 28
1 KIT ORAL DAILY
Qty: 84 TABLET | Refills: 0 | Status: SHIPPED | OUTPATIENT
Start: 2025-02-24

## 2025-03-03 DIAGNOSIS — F32.1 CURRENT MODERATE EPISODE OF MAJOR DEPRESSIVE DISORDER WITHOUT PRIOR EPISODE (MULTI): ICD-10-CM

## 2025-03-03 RX ORDER — BUPROPION HYDROCHLORIDE 150 MG/1
150 TABLET ORAL EVERY MORNING
Qty: 30 TABLET | Refills: 0 | Status: SHIPPED | OUTPATIENT
Start: 2025-03-03 | End: 2025-06-01

## 2025-03-12 ENCOUNTER — APPOINTMENT (OUTPATIENT)
Dept: PRIMARY CARE | Facility: CLINIC | Age: 30
End: 2025-03-12
Payer: COMMERCIAL

## 2025-03-12 VITALS
BODY MASS INDEX: 25.76 KG/M2 | SYSTOLIC BLOOD PRESSURE: 125 MMHG | DIASTOLIC BLOOD PRESSURE: 78 MMHG | HEART RATE: 83 BPM | WEIGHT: 140 LBS | OXYGEN SATURATION: 98 % | HEIGHT: 62 IN

## 2025-03-12 DIAGNOSIS — F32.1 CURRENT MODERATE EPISODE OF MAJOR DEPRESSIVE DISORDER WITHOUT PRIOR EPISODE (MULTI): Primary | ICD-10-CM

## 2025-03-12 DIAGNOSIS — K59.09 OTHER CONSTIPATION: ICD-10-CM

## 2025-03-12 DIAGNOSIS — Z78.9 ALCOHOL USE: ICD-10-CM

## 2025-03-12 DIAGNOSIS — F41.1 ANXIETY, GENERALIZED: ICD-10-CM

## 2025-03-12 DIAGNOSIS — Z00.00 ENCOUNTER FOR PREVENTATIVE ADULT HEALTH CARE EXAMINATION: ICD-10-CM

## 2025-03-12 PROCEDURE — 3008F BODY MASS INDEX DOCD: CPT | Performed by: INTERNAL MEDICINE

## 2025-03-12 PROCEDURE — 1036F TOBACCO NON-USER: CPT | Performed by: INTERNAL MEDICINE

## 2025-03-12 PROCEDURE — 99395 PREV VISIT EST AGE 18-39: CPT | Performed by: INTERNAL MEDICINE

## 2025-03-12 RX ORDER — AMOXICILLIN 875 MG/1
1 TABLET, FILM COATED ORAL
COMMUNITY
Start: 2025-01-19 | End: 2025-03-12 | Stop reason: WASHOUT

## 2025-03-12 ASSESSMENT — PATIENT HEALTH QUESTIONNAIRE - PHQ9
SUM OF ALL RESPONSES TO PHQ9 QUESTIONS 1 AND 2: 0
1. LITTLE INTEREST OR PLEASURE IN DOING THINGS: NOT AT ALL
2. FEELING DOWN, DEPRESSED OR HOPELESS: NOT AT ALL

## 2025-03-12 ASSESSMENT — PAIN SCALES - GENERAL: PAINLEVEL_OUTOF10: 0-NO PAIN

## 2025-03-12 NOTE — ASSESSMENT & PLAN NOTE
As above    Orders:    Follow Up In [facility] - PCP - Established; Future  Follow-up 6 months virtually labs prior

## 2025-03-12 NOTE — PATIENT INSTRUCTIONS
Eldon,   Anxiety - called psychology   Try [employer] website.   Reduce alcohol - goal is 2 drinks a week!   Schedule pap with gynecology later this year   Constipation - increase watr intake, fiber intake. Consider daily miralax   Schedule vision test   Take 1000 units of Vitamin D3 daily   Follow-up 6 months virtually, labs can be done 1 week before

## 2025-03-12 NOTE — ASSESSMENT & PLAN NOTE
Significant improvement in symptoms on Lexapro with addition of Wellbutrin 150 mg  Some residual anxiety, not worsened with Wellbutrin  Encourage scheduling with behavioral health for additional management and reducing alcohol intake.  Will monitor for improvement and follow-up in 6 months time    Orders:    Referral to Psychology; Future    Follow Up In [facility] - PCP - Established; Future

## 2025-03-12 NOTE — ASSESSMENT & PLAN NOTE
AUD, declined medical management has since reduced intake since last being seen  Encouragement provided, declines additional assistance at this time.  Will monitor for progress after following up with therapist in 6 months time    Orders:    Follow Up In [facility] - PCP - Established; Future

## 2025-03-12 NOTE — PROGRESS NOTES
"Subjective     Patient ID: Eldon Feliciano is a 29 y.o. female who presents for Annual Exam.  HPI     29-year-old female here for preventive care visit, last seen virtually in October.    - Depression and anxiety -anxiety improved with Lexapro with persistent depression added Wellbutrin noted to have improvemetn in symptoms on this medication, still with residual anxiousness.  She was also referred to behavioral health with second referral not scheduled. Notes situational anxiousness, worse in the morning and triggered by certain situations, is more hesitant regarding behavioral health.   - Alcohol use disorder-using to cope with depression and anxiety discussed consideration for naltrexone at last visit, has since reduced intake on the wellbutrin, now 3-4x/week, total of around 10 drinks triggered by stress and anxiety typically.  - Constipation -MiraLAX  - Hypertriglyceridemia -recommended lifestyle modifications  - Family history of Alzheimer's     Social  -Works in interior design   - No tobacco, 2-3 drinks/day, no illicits  - Lives at home with fiancee     Lifestyle   - Diet -working to improve this as she adjusts to new home.   - Exercise - previously [employer] 4-5x/week when on a good schedule. Looking to get back into it   - Sleep - improved from prior, 6-8 hours no interruptions, no snoring but fiancee snores.      Objective       Current Outpatient Medications:     buPROPion XL (Wellbutrin XL) 150 mg 24 hr tablet, TAKE 1 TABLET (150 MG) BY MOUTH ONCE DAILY IN THE MORNING. DO NOT CRUSH, CHEW, OR SPLIT., Disp: 30 tablet, Rfl: 0    escitalopram (Lexapro) 20 mg tablet, TAKE 1 TABLET BY MOUTH EVERY DAY, Disp: 90 tablet, Rfl: 1    norgestimate-ethinyl estradioL (Ortho Tri-Cyclen,Trinessa) 0.18/0.215/0.25 mg-35 mcg (28) tablet, TAKE 1 TABLET BY MOUTH EVERY DAY, Disp: 84 tablet, Rfl: 0      /78   Pulse 83   Ht 1.575 m (5' 2.01\")   Wt 63.5 kg (140 lb)   LMP 02/26/2025   SpO2 98%   BMI 25.60 kg/m² "   General: Awake, alert, appears stated age   Head/eyes/ears: NCAT, EOMI, PERRL, TM WNL, no cerumen  Throat: moist mucus membranes, no pharyngeal erythema  Neck: Supple, nontender, no lymphadenopathy, thyroid exam unremarkable   Breast exam: No palpable lumps, no discharge, no noted axillary lymphadenopathy, chaperone offered and declined  Heart: RRR, no murmurs, rubs or gallops  Lungs: CTA bilaterally, no rhonchi rales or wheezes   Abdomen: Soft, NT/ND  Extremities: No edema, 2+ DP pulses   Skin: sunburn on chest wall (recent trip to [place]) no concerning skin lesions on visualized skin   Neuro: AAO x 3, no FND, gait unremarkable       Assessment/Plan         Assessment & Plan  Encounter for preventative adult health care examination  Adult Health Examination  Age appropriate screening performed   Healthy lifestyle reviewed.   No additional pertinent family history or toxic habits   No high risk sexual behavior, declines STI screen   Cancer screening:   - Colonoscopy at 47   - Mammogram at 42, discussed self breast exams   - Pap smear 9/22 repeat later this year  - Skin cancer prevention strategies reviewed   Immunizations   - Due: Hepatitis B declined, flu declined, covid declined,   - UTD: Tdap, HPV,   Dentist UTD , vision due   Discussed adequate Vitamin D intake   Orders:    Comprehensive Metabolic Panel; Future    CBC and Auto Differential; Future    Lipid Panel; Future    Hemoglobin A1C; Future    Vitamin D 25-Hydroxy,Total (for eval of Vitamin D levels); Future    TSH with reflex to Free T4 if abnormal; Future    Current moderate episode of major depressive disorder without prior episode (Multi)  Significant improvement in symptoms on Lexapro with addition of Wellbutrin 150 mg  Some residual anxiety, not worsened with Wellbutrin  Encourage scheduling with behavioral health for additional management and reducing alcohol intake.  Will monitor for improvement and follow-up in 6 months time    Orders:     Referral to Psychology; Future    Follow Up In [facility] - PCP - Established; Future    Alcohol use  AUD, declined medical management has since reduced intake since last being seen  Encouragement provided, declines additional assistance at this time.  Will monitor for progress after following up with therapist in 6 months time    Orders:    Follow Up In [facility] - PCP - Established; Future    Anxiety, generalized  As above    Orders:    Follow Up In [facility] - PCP - Established; Future  Follow-up 6 months virtually labs prior  Other constipation  Advised fiber intake and MiraLAX no concerns for IBS.         Send me a message in 6 month's time.

## 2025-03-26 ENCOUNTER — OFFICE VISIT (OUTPATIENT)
Dept: OBSTETRICS AND GYNECOLOGY | Facility: CLINIC | Age: 30
End: 2025-03-26
Payer: COMMERCIAL

## 2025-03-26 VITALS — WEIGHT: 141 LBS | BODY MASS INDEX: 25.78 KG/M2 | DIASTOLIC BLOOD PRESSURE: 77 MMHG | SYSTOLIC BLOOD PRESSURE: 118 MMHG

## 2025-03-26 DIAGNOSIS — Z30.41 SURVEILLANCE FOR BIRTH CONTROL, ORAL CONTRACEPTIVES: ICD-10-CM

## 2025-03-26 DIAGNOSIS — Z30.41 ENCOUNTER FOR SURVEILLANCE OF CONTRACEPTIVE PILLS: ICD-10-CM

## 2025-03-26 DIAGNOSIS — Z12.4 PAP SMEAR FOR CERVICAL CANCER SCREENING: ICD-10-CM

## 2025-03-26 DIAGNOSIS — Z01.419 WELL WOMAN EXAM WITH ROUTINE GYNECOLOGICAL EXAM: Primary | ICD-10-CM

## 2025-03-26 PROCEDURE — 99395 PREV VISIT EST AGE 18-39: CPT | Performed by: NURSE PRACTITIONER

## 2025-03-26 RX ORDER — NORGESTIMATE AND ETHINYL ESTRADIOL 7DAYSX3 28
1 KIT ORAL DAILY
Qty: 84 TABLET | Refills: 3 | Status: SHIPPED | OUTPATIENT
Start: 2025-03-26

## 2025-03-26 NOTE — PROGRESS NOTES
HPI: Eldon Feliciano is a 29 y.o. year old  presenting for annual gyn exam  Currently still on OCPs    Current concerns: none, doing well on OCP  Just got engaged this past year, planning wedding local in [place].    Cycles are every 28days, lasting for 4 days   LMP:  Patient's last menstrual period was 2025.  Sexually active: yes  STI concerns: no  Contraception: OCP   Last pap: 2022 normal  History of abnormal pap: no  Other gyn history: none   OB History        0    Para   0    Term   0       0    AB   0    Living   0       SAB   0    IAB   0    Ectopic   0    Multiple   0    Live Births   0              No past medical history on file.   No past surgical history on file.   Social History    Socioeconomic History      Marital status: Significant Other      Spouse name: Not on file      Number of children: Not on file      Years of education: Not on file      Highest education level: Not on file    Occupational History      Not on file    Tobacco Use      Smoking status: Never      Smokeless tobacco: Never    Substance and Sexual Activity      Alcohol use: Yes        Alcohol/week: 10.0 standard drinks of alcohol        Types: 10 Standard drinks or equivalent per week      Drug use: Never      Sexual activity: Not on file    Other Topics      Concerns:        Not on file    Social History Narrative      Not on file    Social Drivers of Health  Financial Resource Strain: Not on file  Food Insecurity: Not on file  Transportation Needs: Not on file  Physical Activity: Not on file  Stress: Not on file  Social Connections: Not on file  Intimate Partner Violence: Not on file  Housing Stability: Not on file   Review of patient's family history indicates:  Problem: Alzheimer's disease      Relation: Father          Name:               Age of Onset: (Not Specified)  Problem: Breast cancer      Relation: Maternal Grandmother          Name:               Age of Onset: 55  Problem: Cancer      Relation:  Maternal Grandfather          Name:               Age of Onset: (Not Specified)              Comment: lung, bone, stomach - all primary  Problem: Alzheimer's disease      Relation: Paternal Grandmother          Name:               Age of Onset: (Not Specified)     Current Outpatient Medications:   buPROPion XL (Wellbutrin XL) 150 mg 24 hr tablet, TAKE 1 TABLET (150 MG) BY MOUTH ONCE DAILY IN THE MORNING. DO NOT CRUSH, CHEW, OR SPLIT., Disp: 30 tablet, Rfl: 0  escitalopram (Lexapro) 20 mg tablet, TAKE 1 TABLET BY MOUTH EVERY DAY, Disp: 90 tablet, Rfl: 1  norgestimate-ethinyl estradioL (Ortho Tri-Cyclen,Trinessa) 0.18/0.215/0.25 mg-35 mcg (28) tablet, TAKE 1 TABLET BY MOUTH EVERY DAY, Disp: 84 tablet, Rfl: 0          REVIEW OF SYSTEMS:  Breast. denies masses, nipple discharge  : denies dysuria, hematuria incontinence   Gl: denies change in bowel habits, blood in stools      PHYSICAL EXAM:  Vitals: /77 (BP Location: Right arm, Patient Position: Sitting)   Wt 64 kg (141 lb)   LMP 02/26/2025   BMI 25.78 kg/m²   Gen: well appearing woman in NAD  HEENT: normocephalic, thyroid not enlarged, no lymphadenopathy  CV: no m/r/g  Chest: CTAB, no w/r/r  Breast: normal tissue bilaterally without masses, skin changes, lymphadenopathy   Abdomen: soft, nontender, no masses/hernias, liver and spleen not enlarged   Pelvic:  - external genitalia: normal  - vagina: normal  - cervix: normal  - uterus: normal size, nontender  - adnexa: no palpable masses or tenderness  RECTAL: no external hemorrhoids; rectal exam deferred    ASSESSMENT/PLAN :    1) Health maintenance, Well-woman exam.  Pap done with reflex HPV  Nutrition, exercise and routine health maintenance exams reviewed.  Calcium/Vitamin D supplementation information provided   Smoking cessation: never smoker  2) Contraception: OCPs satisfactory.   Discussed DVT and cardiovascular risk with OCP use. Patient is aware of this risk and desires to continue current oral  contraceptive.   3) STD screening: declines, no concerns  4) Follow up one year or sooner as needed

## 2025-03-30 DIAGNOSIS — F32.1 CURRENT MODERATE EPISODE OF MAJOR DEPRESSIVE DISORDER WITHOUT PRIOR EPISODE (MULTI): ICD-10-CM

## 2025-03-31 RX ORDER — BUPROPION HYDROCHLORIDE 150 MG/1
150 TABLET ORAL EVERY MORNING
Qty: 90 TABLET | Refills: 1 | Status: SHIPPED | OUTPATIENT
Start: 2025-03-31 | End: 2025-09-27

## 2025-04-08 LAB
CYTOLOGY CMNT CVX/VAG CYTO-IMP: NORMAL
LAB AP HPV GENOTYPE QUESTION: NO
LAB AP HPV HR: NORMAL
LABORATORY COMMENT REPORT: NORMAL
PATH REPORT.TOTAL CANCER: NORMAL

## 2025-06-30 DIAGNOSIS — F32.1 CURRENT MODERATE EPISODE OF MAJOR DEPRESSIVE DISORDER WITHOUT PRIOR EPISODE (MULTI): ICD-10-CM

## 2025-06-30 DIAGNOSIS — F41.1 ANXIETY, GENERALIZED: ICD-10-CM

## 2025-06-30 RX ORDER — ESCITALOPRAM OXALATE 20 MG/1
20 TABLET ORAL DAILY
Qty: 30 TABLET | Refills: 5 | Status: SHIPPED | OUTPATIENT
Start: 2025-06-30

## 2025-07-12 LAB
25(OH)D3+25(OH)D2 SERPL-MCNC: 33 NG/ML (ref 30–100)
ALBUMIN SERPL-MCNC: 4.8 G/DL (ref 3.6–5.1)
ALP SERPL-CCNC: 45 U/L (ref 31–125)
ALT SERPL-CCNC: 11 U/L (ref 6–29)
ANION GAP SERPL CALCULATED.4IONS-SCNC: 10 MMOL/L (CALC) (ref 7–17)
AST SERPL-CCNC: 17 U/L (ref 10–30)
BASOPHILS # BLD AUTO: 41 CELLS/UL (ref 0–200)
BASOPHILS NFR BLD AUTO: 0.6 %
BILIRUB SERPL-MCNC: 0.6 MG/DL (ref 0.2–1.2)
BUN SERPL-MCNC: 12 MG/DL (ref 7–25)
CALCIUM SERPL-MCNC: 9.4 MG/DL (ref 8.6–10.2)
CHLORIDE SERPL-SCNC: 103 MMOL/L (ref 98–110)
CHOLEST SERPL-MCNC: 209 MG/DL
CHOLEST/HDLC SERPL: 2.8 (CALC)
CO2 SERPL-SCNC: 24 MMOL/L (ref 20–32)
CREAT SERPL-MCNC: 0.69 MG/DL (ref 0.5–0.96)
EGFRCR SERPLBLD CKD-EPI 2021: 122 ML/MIN/1.73M2
EOSINOPHIL # BLD AUTO: 102 CELLS/UL (ref 15–500)
EOSINOPHIL NFR BLD AUTO: 1.5 %
ERYTHROCYTE [DISTWIDTH] IN BLOOD BY AUTOMATED COUNT: 12.8 % (ref 11–15)
EST. AVERAGE GLUCOSE BLD GHB EST-MCNC: 103 MG/DL
EST. AVERAGE GLUCOSE BLD GHB EST-SCNC: 5.7 MMOL/L
GLUCOSE SERPL-MCNC: 89 MG/DL (ref 65–99)
HBA1C MFR BLD: 5.2 %
HCT VFR BLD AUTO: 42.3 % (ref 35–45)
HDLC SERPL-MCNC: 74 MG/DL
HGB BLD-MCNC: 13.3 G/DL (ref 11.7–15.5)
LDLC SERPL CALC-MCNC: 105 MG/DL (CALC)
LYMPHOCYTES # BLD AUTO: 2264 CELLS/UL (ref 850–3900)
LYMPHOCYTES NFR BLD AUTO: 33.3 %
MCH RBC QN AUTO: 26.7 PG (ref 27–33)
MCHC RBC AUTO-ENTMCNC: 31.4 G/DL (ref 32–36)
MCV RBC AUTO: 84.9 FL (ref 80–100)
MONOCYTES # BLD AUTO: 381 CELLS/UL (ref 200–950)
MONOCYTES NFR BLD AUTO: 5.6 %
NEUTROPHILS # BLD AUTO: 4012 CELLS/UL (ref 1500–7800)
NEUTROPHILS NFR BLD AUTO: 59 %
NONHDLC SERPL-MCNC: 135 MG/DL (CALC)
PLATELET # BLD AUTO: 239 THOUSAND/UL (ref 140–400)
PMV BLD REES-ECKER: 10.1 FL (ref 7.5–12.5)
POTASSIUM SERPL-SCNC: 4.1 MMOL/L (ref 3.5–5.3)
PROT SERPL-MCNC: 7.4 G/DL (ref 6.1–8.1)
RBC # BLD AUTO: 4.98 MILLION/UL (ref 3.8–5.1)
SODIUM SERPL-SCNC: 137 MMOL/L (ref 135–146)
TRIGL SERPL-MCNC: 186 MG/DL
TSH SERPL-ACNC: 3.86 MIU/L
WBC # BLD AUTO: 6.8 THOUSAND/UL (ref 3.8–10.8)

## 2025-09-16 ENCOUNTER — APPOINTMENT (OUTPATIENT)
Dept: PRIMARY CARE | Facility: CLINIC | Age: 30
End: 2025-09-16
Payer: COMMERCIAL